# Patient Record
Sex: MALE | Race: WHITE
[De-identification: names, ages, dates, MRNs, and addresses within clinical notes are randomized per-mention and may not be internally consistent; named-entity substitution may affect disease eponyms.]

---

## 2020-01-05 ENCOUNTER — HOSPITAL ENCOUNTER (OUTPATIENT)
Dept: HOSPITAL 76 - DI | Age: 55
Discharge: HOME | End: 2020-01-05
Attending: FAMILY MEDICINE
Payer: COMMERCIAL

## 2020-01-05 DIAGNOSIS — R10.811: Primary | ICD-10-CM

## 2020-01-05 DIAGNOSIS — K21.9: ICD-10-CM

## 2020-01-05 DIAGNOSIS — K76.0: ICD-10-CM

## 2020-01-05 PROCEDURE — 76705 ECHO EXAM OF ABDOMEN: CPT

## 2020-01-05 NOTE — ULTRASOUND REPORT
Reason:  ABD TENDERNESS RUQ, GERD

Procedure Date:  01/05/2020   

Accession Number:  [id number] / [id number]                    

Procedure:  US  - Abdomen Limited CPT Code:  

 

***Final Report***

 

 

FULL RESULT:

 

 

EXAM:

ABDOMEN LIMITED

 

EXAM DATE: 1/5/2020 07:38 AM

 

INDICATION: ABD TENDERNESS RUQ, GERD.

 

COMPARISONS: NONE.

 

TECHNIQUE: Real-time scanning was performed with static images obtained.

 

FINDINGS:

Liver: Left liver lobe appears small. Liver parenchyma is heterogeneous 

and moderately hyperechoic.   No discrete liver masses or intrahepatic 

bile duct dilation.  However, evaluation for masses is limited secondary 

to the echogenicity.  Right liver measures 17 cm. Main portal vein flow: 

Hepatopetal.

 

Gallbladder: Normal. No stones, wall thickening, or sonographic Dave's 

sign.

 

Biliary System: CBD measures 4 mm. No intrahepatic or extrahepatic ductal 

dilatation.

 

Pancreas: Normal.

 

Right kidney: 10.1 cm. No hydronephrosis.

 

Abdominal aorta and IVC: Normal.

 

Other: None.

IMPRESSION:

1. No liver mass or intrahepatic dilation.Echogenic moderately fatty 

liver.

2. Normal gallbladder and common bile duct.

3. Normal pancreas.

 

[facility]